# Patient Record
Sex: MALE | Race: BLACK OR AFRICAN AMERICAN | Employment: UNEMPLOYED | URBAN - METROPOLITAN AREA
[De-identification: names, ages, dates, MRNs, and addresses within clinical notes are randomized per-mention and may not be internally consistent; named-entity substitution may affect disease eponyms.]

---

## 2017-03-07 ENCOUNTER — TELEPHONE (OUTPATIENT)
Dept: INTERNAL MEDICINE CLINIC | Age: 62
End: 2017-03-07

## 2017-04-25 DIAGNOSIS — I69.90 LATE EFFECTS OF CVA (CEREBROVASCULAR ACCIDENT): ICD-10-CM

## 2017-04-25 RX ORDER — FUROSEMIDE 40 MG/1
TABLET ORAL
Qty: 30 TABLET | Refills: 0 | Status: SHIPPED | OUTPATIENT
Start: 2017-04-25 | End: 2017-06-05 | Stop reason: SDUPTHER

## 2017-06-05 ENCOUNTER — OFFICE VISIT (OUTPATIENT)
Dept: INTERNAL MEDICINE CLINIC | Age: 62
End: 2017-06-05

## 2017-06-05 VITALS
WEIGHT: 275 LBS | SYSTOLIC BLOOD PRESSURE: 118 MMHG | HEART RATE: 68 BPM | RESPIRATION RATE: 16 BRPM | DIASTOLIC BLOOD PRESSURE: 76 MMHG | BODY MASS INDEX: 37.3 KG/M2

## 2017-06-05 DIAGNOSIS — Z12.5 PROSTATE CANCER SCREENING: ICD-10-CM

## 2017-06-05 DIAGNOSIS — M25.562 ACUTE PAIN OF LEFT KNEE: Primary | ICD-10-CM

## 2017-06-05 DIAGNOSIS — I69.90 LATE EFFECTS OF CVA (CEREBROVASCULAR ACCIDENT): ICD-10-CM

## 2017-06-05 DIAGNOSIS — I10 BENIGN HYPERTENSION: ICD-10-CM

## 2017-06-05 PROCEDURE — 99213 OFFICE O/P EST LOW 20 MIN: CPT | Performed by: INTERNAL MEDICINE

## 2017-06-05 RX ORDER — FUROSEMIDE 40 MG/1
TABLET ORAL
Qty: 30 TABLET | Refills: 3 | Status: SHIPPED | OUTPATIENT
Start: 2017-06-05 | End: 2017-06-13 | Stop reason: SDUPTHER

## 2017-06-05 RX ORDER — AMLODIPINE BESYLATE AND BENAZEPRIL HYDROCHLORIDE 5; 20 MG/1; MG/1
CAPSULE ORAL
Qty: 30 CAPSULE | Refills: 3 | Status: SHIPPED | OUTPATIENT
Start: 2017-06-05 | End: 2017-11-09 | Stop reason: SDUPTHER

## 2017-06-05 RX ORDER — IBUPROFEN 800 MG/1
TABLET ORAL
Qty: 270 TABLET | Refills: 1 | Status: SHIPPED | OUTPATIENT
Start: 2017-06-05

## 2017-06-08 ENCOUNTER — TELEPHONE (OUTPATIENT)
Dept: INTERNAL MEDICINE CLINIC | Age: 62
End: 2017-06-08

## 2017-06-12 ASSESSMENT — ENCOUNTER SYMPTOMS
GASTROINTESTINAL NEGATIVE: 1
EYES NEGATIVE: 1
RESPIRATORY NEGATIVE: 1
ALLERGIC/IMMUNOLOGIC NEGATIVE: 1

## 2017-06-13 ENCOUNTER — TELEPHONE (OUTPATIENT)
Dept: INTERNAL MEDICINE CLINIC | Age: 62
End: 2017-06-13

## 2017-06-13 DIAGNOSIS — I69.90 LATE EFFECTS OF CVA (CEREBROVASCULAR ACCIDENT): ICD-10-CM

## 2017-06-13 RX ORDER — FUROSEMIDE 40 MG/1
TABLET ORAL
Qty: 30 TABLET | Refills: 3 | Status: SHIPPED | OUTPATIENT
Start: 2017-06-13 | End: 2017-11-09 | Stop reason: SDUPTHER

## 2017-06-13 RX ORDER — ETODOLAC 400 MG/1
400 TABLET, FILM COATED ORAL 2 TIMES DAILY
Qty: 60 TABLET | Refills: 3 | Status: SHIPPED | OUTPATIENT
Start: 2017-06-13 | End: 2018-06-13

## 2017-06-14 ENCOUNTER — TELEPHONE (OUTPATIENT)
Dept: INTERNAL MEDICINE CLINIC | Age: 62
End: 2017-06-14

## 2017-07-10 DIAGNOSIS — I69.90 LATE EFFECTS OF CVA (CEREBROVASCULAR ACCIDENT): ICD-10-CM

## 2017-07-10 DIAGNOSIS — I10 BENIGN HYPERTENSION: ICD-10-CM

## 2017-07-10 RX ORDER — AMLODIPINE BESYLATE AND BENAZEPRIL HYDROCHLORIDE 5; 20 MG/1; MG/1
CAPSULE ORAL
Qty: 30 CAPSULE | Refills: 3 | Status: SHIPPED | OUTPATIENT
Start: 2017-07-10 | End: 2017-11-09 | Stop reason: SDUPTHER

## 2017-11-09 DIAGNOSIS — I69.90 LATE EFFECTS OF CVA (CEREBROVASCULAR ACCIDENT): ICD-10-CM

## 2017-11-09 DIAGNOSIS — I10 BENIGN HYPERTENSION: ICD-10-CM

## 2017-11-09 RX ORDER — FUROSEMIDE 40 MG/1
TABLET ORAL
Qty: 30 TABLET | Refills: 3 | Status: SHIPPED | OUTPATIENT
Start: 2017-11-09

## 2017-11-09 RX ORDER — AMLODIPINE BESYLATE AND BENAZEPRIL HYDROCHLORIDE 5; 20 MG/1; MG/1
CAPSULE ORAL
Qty: 30 CAPSULE | Refills: 3 | Status: SHIPPED | OUTPATIENT
Start: 2017-11-09

## 2025-06-15 ENCOUNTER — APPOINTMENT (OUTPATIENT)
Dept: GENERAL RADIOLOGY | Age: 70
End: 2025-06-15
Payer: COMMERCIAL

## 2025-06-15 ENCOUNTER — APPOINTMENT (OUTPATIENT)
Dept: CT IMAGING | Age: 70
End: 2025-06-15
Payer: COMMERCIAL

## 2025-06-15 ENCOUNTER — HOSPITAL ENCOUNTER (EMERGENCY)
Age: 70
Discharge: HOME | End: 2025-06-15
Attending: STUDENT IN AN ORGANIZED HEALTH CARE EDUCATION/TRAINING PROGRAM
Payer: COMMERCIAL

## 2025-06-15 VITALS
SYSTOLIC BLOOD PRESSURE: 133 MMHG | HEART RATE: 67 BPM | TEMPERATURE: 98.3 F | OXYGEN SATURATION: 94 % | RESPIRATION RATE: 29 BRPM | DIASTOLIC BLOOD PRESSURE: 69 MMHG

## 2025-06-15 DIAGNOSIS — R07.9 CHEST PAIN, UNSPECIFIED TYPE: ICD-10-CM

## 2025-06-15 DIAGNOSIS — M89.8X9 LYTIC BONE LESIONS ON XRAY: Primary | ICD-10-CM

## 2025-06-15 LAB
ALBUMIN SERPL-MCNC: 3.5 G/DL (ref 3.4–5)
ALBUMIN/GLOB SERPL: ABNORMAL {RATIO} (ref 1.1–2.2)
ALP SERPL-CCNC: 90 U/L (ref 40–129)
ALT SERPL-CCNC: ABNORMAL U/L (ref 10–40)
ALT SERPL-CCNC: NORMAL U/L (ref 10–40)
ANION GAP SERPL CALCULATED.3IONS-SCNC: 9 MMOL/L (ref 3–16)
AST SERPL-CCNC: 104 U/L (ref 15–37)
BASOPHILS # BLD: 0.1 K/UL (ref 0–0.2)
BASOPHILS NFR BLD: 0.8 %
BILIRUB SERPL-MCNC: 0.8 MG/DL (ref 0–1)
BUN SERPL-MCNC: 16 MG/DL (ref 7–20)
CALCIUM SERPL-MCNC: 9.2 MG/DL (ref 8.3–10.6)
CHLORIDE SERPL-SCNC: 101 MMOL/L (ref 99–110)
CO2 SERPL-SCNC: 22 MMOL/L (ref 21–32)
CREAT SERPL-MCNC: 1 MG/DL (ref 0.8–1.3)
D-DIMER QUANTITATIVE: 4.5 UG/ML FEU (ref 0–0.6)
DEPRECATED RDW RBC AUTO: 16.2 % (ref 12.4–15.4)
EOSINOPHIL # BLD: 0.1 K/UL (ref 0–0.6)
EOSINOPHIL NFR BLD: 0.6 %
GFR SERPLBLD CREATININE-BSD FMLA CKD-EPI: 81 ML/MIN/{1.73_M2}
GLUCOSE SERPL-MCNC: 112 MG/DL (ref 70–99)
HCT VFR BLD AUTO: 35.9 % (ref 40.5–52.5)
HGB BLD-MCNC: 12 G/DL (ref 13.5–17.5)
LYMPHOCYTES # BLD: 1.7 K/UL (ref 1–5.1)
LYMPHOCYTES NFR BLD: 18.1 %
MCH RBC QN AUTO: 26.5 PG (ref 26–34)
MCHC RBC AUTO-ENTMCNC: 33.5 G/DL (ref 31–36)
MCV RBC AUTO: 79 FL (ref 80–100)
MONOCYTES # BLD: 1.1 K/UL (ref 0–1.3)
MONOCYTES NFR BLD: 12.1 %
NEUTROPHILS # BLD: 6.4 K/UL (ref 1.7–7.7)
NEUTROPHILS NFR BLD: 68.4 %
PLATELET # BLD AUTO: 226 K/UL (ref 135–450)
PMV BLD AUTO: 8.4 FL (ref 5–10.5)
POTASSIUM SERPL-SCNC: ABNORMAL MMOL/L (ref 3.5–5.1)
POTASSIUM SERPL-SCNC: NORMAL MMOL/L (ref 3.5–5.1)
PROT SERPL-MCNC: 7.5 G/DL (ref 6.4–8.2)
PROT SERPL-MCNC: ABNORMAL G/DL (ref 6.4–8.2)
RBC # BLD AUTO: 4.55 M/UL (ref 4.2–5.9)
SODIUM SERPL-SCNC: 132 MMOL/L (ref 136–145)
TROPONIN, HIGH SENSITIVITY: 12 NG/L (ref 0–22)
TROPONIN, HIGH SENSITIVITY: 14 NG/L (ref 0–22)
WBC # BLD AUTO: 9.4 K/UL (ref 4–11)

## 2025-06-15 PROCEDURE — 85025 COMPLETE CBC W/AUTO DIFF WBC: CPT

## 2025-06-15 PROCEDURE — 84484 ASSAY OF TROPONIN QUANT: CPT

## 2025-06-15 PROCEDURE — 84155 ASSAY OF PROTEIN SERUM: CPT

## 2025-06-15 PROCEDURE — 74174 CTA ABD&PLVS W/CONTRAST: CPT

## 2025-06-15 PROCEDURE — 85379 FIBRIN DEGRADATION QUANT: CPT

## 2025-06-15 PROCEDURE — 6360000004 HC RX CONTRAST MEDICATION: Performed by: STUDENT IN AN ORGANIZED HEALTH CARE EDUCATION/TRAINING PROGRAM

## 2025-06-15 PROCEDURE — 36415 COLL VENOUS BLD VENIPUNCTURE: CPT

## 2025-06-15 PROCEDURE — 93005 ELECTROCARDIOGRAM TRACING: CPT | Performed by: STUDENT IN AN ORGANIZED HEALTH CARE EDUCATION/TRAINING PROGRAM

## 2025-06-15 PROCEDURE — 80053 COMPREHEN METABOLIC PANEL: CPT

## 2025-06-15 PROCEDURE — 6370000000 HC RX 637 (ALT 250 FOR IP): Performed by: STUDENT IN AN ORGANIZED HEALTH CARE EDUCATION/TRAINING PROGRAM

## 2025-06-15 PROCEDURE — 84132 ASSAY OF SERUM POTASSIUM: CPT

## 2025-06-15 PROCEDURE — 84460 ALANINE AMINO (ALT) (SGPT): CPT

## 2025-06-15 PROCEDURE — 99285 EMERGENCY DEPT VISIT HI MDM: CPT

## 2025-06-15 PROCEDURE — 71046 X-RAY EXAM CHEST 2 VIEWS: CPT

## 2025-06-15 RX ORDER — OXYCODONE HYDROCHLORIDE 5 MG/1
5 TABLET ORAL EVERY 6 HOURS PRN
Qty: 8 TABLET | Refills: 0 | Status: SHIPPED | OUTPATIENT
Start: 2025-06-15 | End: 2025-06-17

## 2025-06-15 RX ORDER — METHOCARBAMOL 500 MG/1
750 TABLET, FILM COATED ORAL ONCE
Status: COMPLETED | OUTPATIENT
Start: 2025-06-15 | End: 2025-06-15

## 2025-06-15 RX ORDER — METHOCARBAMOL 750 MG/1
750 TABLET, FILM COATED ORAL 4 TIMES DAILY
Qty: 40 TABLET | Refills: 0 | Status: SHIPPED | OUTPATIENT
Start: 2025-06-15 | End: 2025-06-25

## 2025-06-15 RX ORDER — IOPAMIDOL 755 MG/ML
75 INJECTION, SOLUTION INTRAVASCULAR
Status: COMPLETED | OUTPATIENT
Start: 2025-06-15 | End: 2025-06-15

## 2025-06-15 RX ADMIN — IOPAMIDOL 75 ML: 755 INJECTION, SOLUTION INTRAVENOUS at 16:56

## 2025-06-15 RX ADMIN — METHOCARBAMOL 750 MG: 500 TABLET ORAL at 15:56

## 2025-06-15 ASSESSMENT — PAIN SCALES - GENERAL
PAINLEVEL_OUTOF10: 1
PAINLEVEL_OUTOF10: 1

## 2025-06-15 ASSESSMENT — PAIN DESCRIPTION - ORIENTATION
ORIENTATION: RIGHT
ORIENTATION: RIGHT

## 2025-06-15 ASSESSMENT — LIFESTYLE VARIABLES: HOW OFTEN DO YOU HAVE A DRINK CONTAINING ALCOHOL: NEVER

## 2025-06-15 ASSESSMENT — PAIN DESCRIPTION - DESCRIPTORS: DESCRIPTORS: ACHING

## 2025-06-15 ASSESSMENT — PAIN - FUNCTIONAL ASSESSMENT: PAIN_FUNCTIONAL_ASSESSMENT: 0-10

## 2025-06-15 ASSESSMENT — PAIN DESCRIPTION - LOCATION
LOCATION: CHEST
LOCATION: CHEST;BACK

## 2025-06-15 NOTE — ED PROVIDER NOTES
ED Attending Attestation Note     Date of evaluation: 6/15/2025    This patient was seen by the resident.  I have seen and examined the patient, agree with the workup, evaluation, management and diagnosis. The care plan has been discussed.  My assessment reveals a 69-year-old male with history of hypertension and prior stroke who is presenting with episodes of right-sided chest wall pain that is also associated with back and groin pain.  Patient is very well-appearing with equal pulses in all 4 extremities.  His heart lung sounds are clear to auscultation.  As patient is endorsing both chest and back pain as well as pain going down his groin and has some pain related weakness of his right lower extremity on flexion of the hip, we did obtain a D-dimer to rule stratify for aortic dissection.  This was positive so we will obtain CT dissection although overall suspicion is relatively low given how comfortable he appears.  He does have moderate heart score as well, initial troponin is 14. CT dissection scan incidentally showed diffuse lytic lesions in multiple locations - including right 6th rib w/ pathologic fracture, right acetabulum and right scapula - all of which are areas where he is endorsing pain. Less suspicious for primary cardiac etiology now but will offer admission for new onset metastatic disease workup.    EKG reviewed showing normal sinus rhythm at 70 bpm.  , QRS 98, QTc 395.  No ST elevations.  Low voltages in inferior leads.  Sinus arrhythmia noted.  Poor R wave progression. No old EKG for comparison.       Dami Andres MD  06/15/25 9583    Addendum - Patient declining admission. Does not want to be hospitalized. He plans to call his PCP first thing in the morning.      Dami Andres MD  06/15/25 8795    
unknown primary versus multiple myeloma though calcium notably within normal limits.  The patient was initially amenable to admission but after some time of processing stated that he felt strong that he wanted to leave the emergency department and follow-up with his primary care provider.  Unfortunately, due to multiple hemolyzed potassium samples this had not yet resulted and I do feel that the patient requires admission for further characterization.  A long conversation had including the possibility of debility and death should the patient leave the emergency department without formal workup completed but the patient was adamant in his choice.  He was instructed to call his primary care provider first thing in the morning to arrange close follow-up and to discuss the imaging results as well as coordinate further care.  He will also return to the emergency department for any new or worsening symptoms.  The patient was able to verbally agreed to the above plan.  Will discharge him with a short course of oxycodone and Robaxin to help manage his likely cancer related pain.  At no point in the patient's care today do not feel that he had the capacity to make adequate medical decisions for himself.    This patient was also evaluated by the attending physician. All care plans were discussed and agreed upon.    Clinical Impression     1. Lytic bone lesions on xray    2. Chest pain, unspecified type        Disposition     PATIENT REFERRED TO:  No follow-up provider specified.    DISCHARGE MEDICATIONS:  Discharge Medication List as of 6/15/2025  6:59 PM          DISPOSITION Decision To Discharge 06/15/2025 06:54:58 PM   DISPOSITION CONDITION Undetermined         At this time, the patient was deemed appropriate for discharge. My customary discharge instructions, including strict return precautions for new or worsening symptoms concerning to the patient, were provided. All of patient's questions were answered satisfactorily,

## 2025-06-15 NOTE — ED NOTES
RN notified phlebotomist for the need for repeat draw of potassium due to specimen hemolysis. Phlebotomist expressed understand and is in route for blood draw. Pt resting comfortably with one bed rail up and call light in reach.     Josiah Salcedo, NEHEMIAS  06/15/25 [id number]

## 2025-06-15 NOTE — DISCHARGE INSTRUCTIONS
You were seen in the hospital for:  1. Lytic bone lesions on xray    2. Chest pain, unspecified type      Your evaluation found:  -Your CT scan is concerning for cancer, either metastatic cancer with an unknown source or a blood cancer called multiple myeloma  -Your heart evaluation did not show any signs of heart attack, but I do think you should be evaluated more closely to make sure that your heart is safe    You were treated with:  -Robaxin for pain    Specific instructions for discharge:  -Please call your primary care physician first thing in the morning to discuss your imaging results, you need to receive a through evaluation to determine the source of your cancer    Results to be followed up on:  -None    Follow up with:  -Your primary care physician within 1 day for recheck of symptoms    You should return to the emergency department if your symptoms worsen or do not resolve. In addition, return if:  -You have a fever (greater than 101 degrees)  -You have chest pain, shortness of breath, abdominal pain, or uncontrollable vomiting  -You are unable to eat or drink  -You pass out  -You have difficulty moving your arms or legs   -You have difficulty speaking or slurred speech  -Or you have any concern that you feel needs acute physician evaluation

## 2025-06-16 LAB
EKG ATRIAL RATE: 70 BPM
EKG DIAGNOSIS: NORMAL
EKG P AXIS: 65 DEGREES
EKG P-R INTERVAL: 184 MS
EKG Q-T INTERVAL: 366 MS
EKG QRS DURATION: 98 MS
EKG QTC CALCULATION (BAZETT): 395 MS
EKG R AXIS: 33 DEGREES
EKG T AXIS: 41 DEGREES
EKG VENTRICULAR RATE: 70 BPM

## 2025-07-18 RX ORDER — ACETAMINOPHEN 500 MG
500 TABLET ORAL EVERY 6 HOURS PRN
COMMUNITY

## 2025-07-18 RX ORDER — MELOXICAM 7.5 MG/1
7.5 TABLET ORAL 2 TIMES DAILY
COMMUNITY

## 2025-07-18 NOTE — PROGRESS NOTES
[facility] PRE-OPERATIVE INSTRUCTIONS    Day of Procedure:  8/1/25              Arrival time:     0800           Surgery time:0930    Take the following medications with a sip of water:  Follow your MD/Surgeons pre-procedure instructions regarding your medications     Do not eat or drink anything after 12:00 midnight except for your prep prior to your surgery.  This includes water, chewing gum, mints and ice chips.   You may brush your teeth and gargle the morning of your surgery, but do not swallow the water.     Please see your family doctor/pediatrician for a history and physical and/or concerning medications.   Bring any test results/reports from your physicians office.   If you are under the care of a heart doctor or specialist doctor, please be aware that you may be asked to see them for clearance.    You may be asked to stop blood thinners such as Coumadin, Plavix, Fragmin, Lovenox, etc., or any anti-inflammatories such as:  Aspirin, Ibuprofen, Advil, Naproxen prior to your surgery.    We also ask that you stop any over the counter medications such as fish oil, vitamin E, glucosamine, garlic, Multivitamins, COQ 10, etc.    We ask that you do not smoke 24 hours prior to surgery.  We ask that you do not  drink any alcoholic beverages 24 hours prior to surgery     You must make arrangements for a responsible adult to take you home after your surgery.    For your safety, you will not be allowed to leave alone or drive yourself home.  Your surgery will be cancelled if you do not have a ride home.     Also for your safety, you must have someone stay with you the first 24 hours after your surgery.     A parent or legal guardian must accompany a child scheduled for surgery and plan to stay at the hospital until the child is discharged.    Please do not bring other children with you.    For your comfort, please wear simple loose fitting clothing to the hospital.  Please do not bring valuables.    Do not

## 2025-07-31 ENCOUNTER — ANESTHESIA EVENT (OUTPATIENT)
Dept: ENDOSCOPY | Age: 70
End: 2025-07-31
Payer: COMMERCIAL

## 2025-08-01 ENCOUNTER — APPOINTMENT (OUTPATIENT)
Dept: ENDOSCOPY | Age: 70
End: 2025-08-01
Attending: INTERNAL MEDICINE
Payer: COMMERCIAL

## 2025-08-01 ENCOUNTER — ANESTHESIA (OUTPATIENT)
Dept: ENDOSCOPY | Age: 70
End: 2025-08-01
Payer: COMMERCIAL

## 2025-08-01 ENCOUNTER — HOSPITAL ENCOUNTER (OUTPATIENT)
Age: 70
Setting detail: OUTPATIENT SURGERY
Discharge: HOME | End: 2025-08-01
Attending: INTERNAL MEDICINE | Admitting: INTERNAL MEDICINE
Payer: COMMERCIAL

## 2025-08-01 VITALS
WEIGHT: 211.2 LBS | OXYGEN SATURATION: 92 % | HEART RATE: 64 BPM | BODY MASS INDEX: 28.61 KG/M2 | RESPIRATION RATE: 18 BRPM | HEIGHT: 72 IN | SYSTOLIC BLOOD PRESSURE: 115 MMHG | DIASTOLIC BLOOD PRESSURE: 62 MMHG | TEMPERATURE: 97.4 F

## 2025-08-01 PROCEDURE — 3700000000 HC ANESTHESIA ATTENDED CARE: Performed by: INTERNAL MEDICINE

## 2025-08-01 PROCEDURE — 6360000002 HC RX W HCPCS: Performed by: NURSE ANESTHETIST, CERTIFIED REGISTERED

## 2025-08-01 PROCEDURE — 7100000010 HC PHASE II RECOVERY - FIRST 15 MIN: Performed by: INTERNAL MEDICINE

## 2025-08-01 PROCEDURE — 7100000011 HC PHASE II RECOVERY - ADDTL 15 MIN: Performed by: INTERNAL MEDICINE

## 2025-08-01 PROCEDURE — 3609027000 HC COLONOSCOPY: Performed by: INTERNAL MEDICINE

## 2025-08-01 PROCEDURE — 7100000000 HC PACU RECOVERY - FIRST 15 MIN: Performed by: INTERNAL MEDICINE

## 2025-08-01 PROCEDURE — 2580000003 HC RX 258: Performed by: ANESTHESIOLOGY

## 2025-08-01 PROCEDURE — 3700000001 HC ADD 15 MINUTES (ANESTHESIA): Performed by: INTERNAL MEDICINE

## 2025-08-01 PROCEDURE — 2709999900 HC NON-CHARGEABLE SUPPLY: Performed by: INTERNAL MEDICINE

## 2025-08-01 PROCEDURE — 7100000001 HC PACU RECOVERY - ADDTL 15 MIN: Performed by: INTERNAL MEDICINE

## 2025-08-01 RX ORDER — SODIUM CHLORIDE 0.9 % (FLUSH) 0.9 %
5-40 SYRINGE (ML) INJECTION EVERY 12 HOURS SCHEDULED
Status: DISCONTINUED | OUTPATIENT
Start: 2025-08-01 | End: 2025-08-01 | Stop reason: HOSPADM

## 2025-08-01 RX ORDER — LIDOCAINE HYDROCHLORIDE 20 MG/ML
INJECTION, SOLUTION EPIDURAL; INFILTRATION; INTRACAUDAL; PERINEURAL
Status: DISCONTINUED | OUTPATIENT
Start: 2025-08-01 | End: 2025-08-01 | Stop reason: SDUPTHER

## 2025-08-01 RX ORDER — SODIUM CHLORIDE 9 MG/ML
INJECTION, SOLUTION INTRAVENOUS PRN
Status: DISCONTINUED | OUTPATIENT
Start: 2025-08-01 | End: 2025-08-01 | Stop reason: HOSPADM

## 2025-08-01 RX ORDER — SODIUM CHLORIDE 0.9 % (FLUSH) 0.9 %
5-40 SYRINGE (ML) INJECTION PRN
Status: DISCONTINUED | OUTPATIENT
Start: 2025-08-01 | End: 2025-08-01 | Stop reason: HOSPADM

## 2025-08-01 RX ORDER — PROPOFOL 10 MG/ML
INJECTION, EMULSION INTRAVENOUS
Status: DISCONTINUED | OUTPATIENT
Start: 2025-08-01 | End: 2025-08-01 | Stop reason: SDUPTHER

## 2025-08-01 RX ADMIN — PROPOFOL 180 MCG/KG/MIN: 10 INJECTION, EMULSION INTRAVENOUS at 09:01

## 2025-08-01 RX ADMIN — SODIUM CHLORIDE: 0.9 INJECTION, SOLUTION INTRAVENOUS at 08:11

## 2025-08-01 RX ADMIN — PROPOFOL 60 MG: 10 INJECTION, EMULSION INTRAVENOUS at 08:59

## 2025-08-01 RX ADMIN — LIDOCAINE HYDROCHLORIDE 60 MG: 20 INJECTION, SOLUTION EPIDURAL; INFILTRATION; INTRACAUDAL; PERINEURAL at 08:59

## 2025-08-01 ASSESSMENT — PAIN SCALES - GENERAL: PAINLEVEL_OUTOF10: 6

## 2025-08-01 ASSESSMENT — PAIN DESCRIPTION - ONSET: ONSET: ON-GOING

## 2025-08-01 ASSESSMENT — PAIN DESCRIPTION - LOCATION: LOCATION: LEG

## 2025-08-01 ASSESSMENT — LIFESTYLE VARIABLES: SMOKING_STATUS: 0

## 2025-08-01 ASSESSMENT — PAIN DESCRIPTION - ORIENTATION: ORIENTATION: RIGHT

## 2025-08-01 ASSESSMENT — PAIN - FUNCTIONAL ASSESSMENT
PAIN_FUNCTIONAL_ASSESSMENT: ACTIVITIES ARE NOT PREVENTED
PAIN_FUNCTIONAL_ASSESSMENT: 0-10

## 2025-08-01 ASSESSMENT — PAIN DESCRIPTION - DESCRIPTORS
DESCRIPTORS: NAGGING
DESCRIPTORS: NAGGING

## 2025-08-01 ASSESSMENT — PAIN DESCRIPTION - FREQUENCY: FREQUENCY: CONTINUOUS

## 2025-08-01 ASSESSMENT — PAIN DESCRIPTION - PAIN TYPE: TYPE: CHRONIC PAIN

## 2025-08-01 NOTE — PROGRESS NOTES
Pt arrived from PACU alert and oriented, vitals stable on room air. No pain in abdomen, pain in leg- same as preop. PO snacks given.

## 2025-08-01 NOTE — PROGRESS NOTES
Conjuntivae and eyelids appear normal, Sclerae : White without injection Pt awake, denies pain.  Abd soft.  To phase 2 care.

## 2025-08-01 NOTE — DISCHARGE INSTRUCTIONS

## 2025-08-01 NOTE — H&P
Gastroenteroloy   Attending Pre-operative History and Physical      PROCEDURE:  colonoscopy    Indication:The patient is a 70 y.o. male presents for a colonoscopy    Past Medical History:    Past Medical History:   Diagnosis Date    Back pain 10/7/08    Benign hypertension 10/16/07    Brain injury without coma (HCC) 07    Erectile dysfunction 10/16/07    Hyperlipidemia 6/28/10    Insomnia 09    Late effects of CVA (cerebrovascular accident) 09    Neuropathy 1/15/08    Pleurisy 7/10/07    Radiculopathy 08      Past Surgical History:    Past Surgical History:   Procedure Laterality Date    COLONOSCOPY      several    WISDOM TOOTH EXTRACTION        Medications Prior to Admission:        Allergies:  Patient has no known allergies.  History of allergic reaction to anesthesia:  No    Social History:   Social History     Socioeconomic History    Marital status:      Spouse name: Not on file    Number of children: Not on file    Years of education: Not on file    Highest education level: Not on file   Occupational History    Not on file   Tobacco Use    Smoking status: Former     Current packs/day: 0.00     Types: Cigarettes     Quit date: 1990     Years since quittin.6    Smokeless tobacco: Not on file   Vaping Use    Vaping status: Never Used   Substance and Sexual Activity    Alcohol use: No    Drug use: No    Sexual activity: Defer   Other Topics Concern    Not on file   Social History Narrative    Not on file     Social Drivers of Health     Financial Resource Strain: Not on file   Food Insecurity: Unknown (2024)    Received from [employer] and [facility]    Food Insecurities     Worried about running out of food: Not on file     Food Bought: Not on file   Transportation Needs: Unknown (2024)    Received from [employer] and [facility]    Transportation     Worried about transportation: Not on file   Physical Activity: Not on file   Stress:  Not on file   Social Connections: Not on file   Intimate Partner Violence: Unknown (1/19/2024)    Received from [employer]    Interpersonal Safety     Feel physically or emotionally unsafe where currently live: Not on file     Harm by anyone: Not on file     Emotionally Harmed: Not on file   Housing Stability: Unknown (1/19/2024)    Received from [facility] and [employer]    Housing/Utilities     Worried about losing home: Not on file     Stayed outside house: Not on file     Unable to get utilities: Not on file      Family History:   History reviewed. No pertinent family history.     PHYSICAL EXAM:      /70   Pulse 66   Temp 97.9 °F (36.6 °C) (Oral)   Resp 16   Ht 1.829 m (6')   Wt 95.8 kg (211 lb 3.2 oz)   SpO2 97%   BMI 28.64 kg/m²  I      Head/ENT:  normocephalic, without obvious abnormalities, atraumatic    Heart:  normal S1 and S2    Lungs:  No increased work of breathing, good air exchange, clear to auscultation bilaterally,no crackles or wheezing    Abdomen:  Normal bowel sounds, soft nondistended, non tender    Extremities:  No clubbing, cyanosis, or edema      ASSESSMENT AND PLAN:    1.  Patient is a 70 y.o. male with above specified procedure planned colonoscopy with deep sedation  2.  Procedure options, risks and benefits reviewed with patient/guardian. Patient/guardian expresses understanding.

## 2025-08-01 NOTE — OP NOTE
Colonoscopy Note    Patient:   Viktor Hawkins    :    1955    Facility:   [facility] [Outpatient]  Referring/PCP: Martell Garcia MD  Procedure:   Colonoscopy   Date:     2025  Endoscopist:  Vitor Canela MD, MD    Preoperative Diagnosis: Abnormal CT scan , possible metastatic disease.    Postoperative Diagnosis:  1.  Suboptimal preparation  2.  Normal mucosa throughout the colon  3.  No evidence of colonic malignancy    Anesthesia: MAC    Estimated blood loss: None    Complications:  None    Specimen: None    Instrument:     Description of Procedure:  Informed consent was obtained from the patient after explanation of the procedure including indications, description of the procedure,  benefits and possible risks and complications of the procedure, and alternatives. Questions were answered.  The patient's history was reviewed and a directed physical examination was performed prior to the procedure.    Patient was monitored throughout the procedure with pulse oximetry and periodic assessment of vital signs. Patient was sedated as noted above. With the patient initially in the left lateral decubitus position, a digital rectal examination was performed and revealed negative without mass, lesions or tenderness.  The Olympus video colonoscope was placed in the patient's rectum and advanced with difficulty  to the cecum, which was identified by the ileocecal valve and appendiceal orifice.   The prep was inadequate.  Examination of the mucosa was performed during both introduction and withdrawal of the colonoscope. Retroflexed view of the rectum was performed.        Findings:     The scope was advanced with some difficulty to the cecum.  The colon was tortuous.  Abdominal pressure was applied.  The quality of the prep was suboptimal.  Some areas of the colon were covered with stools.  Multiple washes were performed.  Suctioning was applied.  The examination is adequate in ruling

## 2025-08-01 NOTE — PROGRESS NOTES
Care Management Initial Consult    General Information  Assessment completed with: Parents,  (mother/caregiver/guardian Savannah)  Type of CM/SW Visit: Offer D/C Planning    Primary Care Provider verified and updated as needed: Yes   Readmission within the last 30 days: no previous admission in last 30 days      Reason for Consult: discharge planning  Advance Care Planning:            Communication Assessment  Patient's communication style: spoken language (English or Bilingual) (pt nonverbal)             Cognitive  Cognitive/Neuro/Behavioral: .WDL except, orientation, speech  Level of Consciousness: alert  Arousal Level: opens eyes spontaneously  Orientation: other (see comments) (HECTOR)  Mood/Behavior: calm  Best Language: 2 - Severe aphasia  Speech: unable to speak (will give thumbs up)    Living Environment:   People in home: parent(s), other (see comments) (PCA lives with mother and pt 24/7)   (Savannah, mother)  Current living Arrangements: house      Able to return to prior arrangements: yes       Family/Social Support:  Care provided by:    Provides care for: no one, unable/limited ability to care for self  Marital Status: Single             Description of Support System:           Current Resources:   Patient receiving home care services: No     Community Resources: [employer], [employer] Worker, PCA, Housekeeping/Chore Agency  Equipment currently used at home:    Supplies currently used at home:      Employment/Financial:  Employment Status: disabled        Financial Concerns: No concerns identified           Does the patient's insurance plan have a 3 day qualifying hospital stay waiver?  No    Lifestyle & Psychosocial Needs:  Social Determinants of Health     Tobacco Use: Medium Risk (7/6/2023)    Patient History     Smoking Tobacco Use: Former     Smokeless Tobacco Use: Never     Passive Exposure: Not on file   Alcohol Use: Not on file   Financial Resource Strain: Not on file   Food Insecurity: Not on file  Pt up to chair, improved leg pain. Discharge instructions completed with wife at bedside. No further questions.     Transportation Needs: Not on file   Physical Activity: Not on file   Stress: Not on file   Social Connections: Not on file   Intimate Partner Violence: Not on file   Depression: Not at risk (6/13/2022)    PHQ-2     PHQ-2 Score: 0   Housing Stability: Not on file       Functional Status:  Prior to admission patient needed assistance:   Dependent ADLs:: Bathing, Dressing, Eating, Grooming, Incontinence, Positioning, Transfers, Toileting  Dependent IADLs:: Laundry, Cooking, Medication Management, Transportation       Mental Health Status:          Chemical Dependency Status:                Values/Beliefs:  Spiritual, Cultural Beliefs, [employer] Practices, Values that affect care:                 Additional Information:  Writer met with pt and mother Savannah at bedside and introduced self and role with discharge planning.  Pt is nonverbal and so consult done with mother. She has been his caregiver for many years. She states they live in a house where all pt's needs are on 1 floor. Mother states they have a PCA that lives with them all the time that assist him mostly on the overnights and med mgmt often. They use a cleaning service 4 days/week for 5 hrs/day. Mother Savannah has 3 daughters and only 1 assists her at times. She states they did use home care RN prior to the pandemic but since then, it has been difficult finding enough staff to cover his needs. She states that pt gets his tube feedings and supplies from [facility]. She's hoping to get pt a manual wheelchair soon where he can move about the house once. Address and PCP were verified. KAY was explained to her and faxed. Mother states her goal is to get pt home soon because she's taking him to a concert this Thursday night to see pop carter [employer]. CM will continue to follow for further discharge needs.      Manish Roca, RN  [facility]  Inpatient Care Management - FLOAT  TOVA RN Mobile: [phone] daily 7:30-4:00     .

## 2025-08-01 NOTE — ANESTHESIA POSTPROCEDURE EVALUATION
Department of Anesthesiology  Postprocedure Note    Patient: Viktor Hawkins  MRN: [id number]  YOB: 1955  Date of evaluation: 8/1/2025    Procedure Summary       Date: 08/01/25 Room / Location: [place] [address] / [facility]    Anesthesia Start: 0854 Anesthesia Stop: 0949    Procedure: COLONOSCOPY Diagnosis:       Screen for colon cancer      (Screen for colon cancer [Z12.11])    Surgeons: Vitor Canela MD Responsible Provider: Lg Guidry MD    Anesthesia Type: MAC ASA Status: 3            Anesthesia Type: MAC    Nirali Phase I: Nirali Score: 10    Nirali Phase II: Nirali Score: 10    Anesthesia Post Evaluation    Patient location during evaluation: PACU  Patient participation: complete - patient participated  Level of consciousness: awake  Airway patency: patent  Nausea & Vomiting: no nausea and no vomiting  Cardiovascular status: hemodynamically stable and blood pressure returned to baseline  Respiratory status: spontaneous ventilation, nonlabored ventilation and room air  Hydration status: stable  Comments: Mr. Hawkins was seen resting following uneventful MAC anesthetic. Appropriate for return to [facility] for planned discharge home with .  Pain management: adequate    No notable events documented.

## 2025-08-01 NOTE — ANESTHESIA PRE PROCEDURE
Department of Anesthesiology  Preprocedure Note       Name:  Viktor Hawkins   Age:  70 y.o.  :  1955                                          MRN:  [id number]         Date:  2025      Surgeon: Surgeon(s):  Vitor Canela MD    Procedure: Procedure(s):  COLONOSCOPY    Medications prior to admission:   Prior to Admission medications    Medication Sig Start Date End Date Taking? Authorizing Provider   meloxicam (MOBIC) 7.5 MG tablet Take 1 tablet by mouth in the morning and at bedtime   Yes Provider, MD Josue   acetaminophen (TYLENOL) 500 MG tablet Take 1 tablet by mouth every 6 hours as needed for Pain   Yes Provider, MD Josue   amLODIPine-benazepril (LOTREL) 5-20 MG per capsule TAKE 1 CAPSULE BY MOUTH DAILY** 17  Yes Martell Garcia MD   furosemide (LASIX) 40 MG tablet TAKE 1 TABLET BY MOUTH EVERY DAY  Patient taking differently: 0.5 tablets daily 17  Yes Martell Garcia MD   Elastic Bandages & Supports (A-4 HIGH COMPRESSION MENS HOSE) MISC Knee high compression hose UAD. 1 pair. Compression is moderate, 15-20 10/23/15  Yes Martell Garcia MD       Current medications:    Current Facility-Administered Medications   Medication Dose Route Frequency Provider Last Rate Last Admin    sodium chloride flush 0.9 % injection 5-40 mL  5-40 mL IntraVENous 2 times per day Kalani Serrano MD        sodium chloride flush 0.9 % injection 5-40 mL  5-40 mL IntraVENous PRN Kalani Serrano MD        0.9 % sodium chloride infusion   IntraVENous PRN Kalani Serrano  mL/hr at 25 0811 New Bag at 25 0811       Allergies:  No Known Allergies    Problem List:    Patient Active Problem List   Diagnosis Code    Brain injury without coma (HCC) S06.9XAA    Neuropathy G62.9    Back pain M54.9    Benign hypertension I10    Erectile dysfunction N52.9    Late effects of CVA (cerebrovascular accident) I69.90    Insomnia G47.00       Past Medical History:

## 2025-08-11 ENCOUNTER — TRANSCRIBE ORDERS (OUTPATIENT)
Dept: ADMINISTRATIVE | Age: 70
End: 2025-08-11

## 2025-08-11 DIAGNOSIS — R63.4 ABNORMAL WEIGHT LOSS: Primary | ICD-10-CM

## 2025-09-05 ENCOUNTER — HOSPITAL ENCOUNTER (OUTPATIENT)
Dept: CT IMAGING | Age: 70
Discharge: HOME | End: 2025-09-05
Payer: COMMERCIAL

## 2025-09-05 DIAGNOSIS — R63.4 ABNORMAL WEIGHT LOSS: ICD-10-CM

## 2025-09-05 PROCEDURE — 74176 CT ABD & PELVIS W/O CONTRAST: CPT

## 2025-09-05 PROCEDURE — 6360000004 HC RX CONTRAST MEDICATION: Performed by: INTERNAL MEDICINE

## 2025-09-05 RX ORDER — IOPAMIDOL 612 MG/ML
50 INJECTION, SOLUTION INTRAVASCULAR
Status: COMPLETED | OUTPATIENT
Start: 2025-09-05 | End: 2025-09-05

## 2025-09-05 RX ADMIN — IOPAMIDOL 50 ML: 612 INJECTION, SOLUTION INTRAVENOUS at 09:39

## (undated) DEVICE — ENDOSCOPY KIT: Brand: MEDLINE INDUSTRIES, INC.